# Patient Record
Sex: MALE | NOT HISPANIC OR LATINO | Employment: STUDENT | URBAN - METROPOLITAN AREA
[De-identification: names, ages, dates, MRNs, and addresses within clinical notes are randomized per-mention and may not be internally consistent; named-entity substitution may affect disease eponyms.]

---
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
## 2023-02-05 ENCOUNTER — HEALTH MAINTENANCE LETTER (OUTPATIENT)
Age: 18
End: 2023-02-05

## 2023-09-11 ENCOUNTER — MYC REFILL (OUTPATIENT)
Dept: FAMILY MEDICINE | Facility: CLINIC | Age: 18
End: 2023-09-11
Payer: COMMERCIAL

## 2023-09-12 RX ORDER — QUETIAPINE FUMARATE 200 MG/1
TABLET, FILM COATED ORAL
OUTPATIENT
Start: 2023-09-12

## 2023-09-12 NOTE — TELEPHONE ENCOUNTER
Does not appear that quetiapine has ever been prescribed by Molly's provider. Outside prescriber w/ last dispense December 2022. Pt also overdue for OV. Will decline prescription.

## 2023-09-12 NOTE — TELEPHONE ENCOUNTER
"Request for medication refill:  QUEtiapine (SEROQUEL) 200 MG tablet   Providers if patient needs an appointment and you are willing to give a one month supply please refill for one month and  send a letter/MyChart using \".SMILLIMITEDREFILL\" .smillimited and route chart to \"[facility] \" (Giving one month refill in non controlled medications is strongly recommended before denial)    If refill has been denied, meaning absolutely no refills without visit, please complete the smart phrase \".smirxrefuse\" and route it to the \"[facility] MED REFILLS\"  pool to inform the patient and the pharmacy.    Ginger Hinojosa RN      "

## 2024-03-03 ENCOUNTER — HEALTH MAINTENANCE LETTER (OUTPATIENT)
Age: 19
End: 2024-03-03

## 2024-05-07 ENCOUNTER — OFFICE VISIT (OUTPATIENT)
Dept: FAMILY MEDICINE | Facility: CLINIC | Age: 19
End: 2024-05-07
Payer: COMMERCIAL

## 2024-05-07 VITALS
HEART RATE: 90 BPM | OXYGEN SATURATION: 96 % | HEIGHT: 69 IN | DIASTOLIC BLOOD PRESSURE: 72 MMHG | BODY MASS INDEX: 29.83 KG/M2 | RESPIRATION RATE: 16 BRPM | WEIGHT: 201.4 LBS | TEMPERATURE: 98.6 F | SYSTOLIC BLOOD PRESSURE: 118 MMHG

## 2024-05-07 DIAGNOSIS — H90.6 MIXED CONDUCTIVE AND SENSORINEURAL HEARING LOSS OF BOTH EARS: ICD-10-CM

## 2024-05-07 DIAGNOSIS — Z00.00 ROUTINE GENERAL MEDICAL EXAMINATION AT A HEALTH CARE FACILITY: Primary | ICD-10-CM

## 2024-05-07 DIAGNOSIS — H61.23 BILATERAL IMPACTED CERUMEN: ICD-10-CM

## 2024-05-07 DIAGNOSIS — Z11.3 SCREENING EXAMINATION FOR STI: ICD-10-CM

## 2024-05-07 DIAGNOSIS — Z11.4 SCREENING FOR HIV (HUMAN IMMUNODEFICIENCY VIRUS): ICD-10-CM

## 2024-05-07 DIAGNOSIS — Z11.59 NEED FOR HEPATITIS C SCREENING TEST: ICD-10-CM

## 2024-05-07 LAB — T PALLIDUM AB SER QL: NONREACTIVE

## 2024-05-07 PROCEDURE — 69210 REMOVE IMPACTED EAR WAX UNI: CPT | Performed by: FAMILY MEDICINE

## 2024-05-07 PROCEDURE — 99213 OFFICE O/P EST LOW 20 MIN: CPT | Mod: 25 | Performed by: FAMILY MEDICINE

## 2024-05-07 PROCEDURE — 99395 PREV VISIT EST AGE 18-39: CPT | Mod: 25 | Performed by: FAMILY MEDICINE

## 2024-05-07 PROCEDURE — 90471 IMMUNIZATION ADMIN: CPT | Performed by: FAMILY MEDICINE

## 2024-05-07 PROCEDURE — 86780 TREPONEMA PALLIDUM: CPT | Performed by: FAMILY MEDICINE

## 2024-05-07 PROCEDURE — 90715 TDAP VACCINE 7 YRS/> IM: CPT | Performed by: FAMILY MEDICINE

## 2024-05-07 PROCEDURE — 86803 HEPATITIS C AB TEST: CPT | Performed by: FAMILY MEDICINE

## 2024-05-07 PROCEDURE — 87389 HIV-1 AG W/HIV-1&-2 AB AG IA: CPT | Performed by: FAMILY MEDICINE

## 2024-05-07 PROCEDURE — 36415 COLL VENOUS BLD VENIPUNCTURE: CPT | Performed by: FAMILY MEDICINE

## 2024-05-07 PROCEDURE — 87491 CHLMYD TRACH DNA AMP PROBE: CPT | Performed by: FAMILY MEDICINE

## 2024-05-07 PROCEDURE — 87591 N.GONORRHOEAE DNA AMP PROB: CPT | Performed by: FAMILY MEDICINE

## 2024-05-07 RX ORDER — TOPIRAMATE SPINKLE 25 MG/1
25 CAPSULE ORAL AT BEDTIME
COMMUNITY

## 2024-05-07 RX ORDER — VENLAFAXINE HYDROCHLORIDE 150 MG/1
150 CAPSULE, EXTENDED RELEASE ORAL DAILY
COMMUNITY

## 2024-05-07 RX ORDER — CHLORHEXIDINE GLUCONATE ORAL RINSE 1.2 MG/ML
SOLUTION DENTAL
COMMUNITY
Start: 2024-04-30

## 2024-05-07 RX ORDER — HYDROCODONE BITARTRATE AND ACETAMINOPHEN 5; 325 MG/1; MG/1
TABLET ORAL
COMMUNITY
Start: 2024-05-03

## 2024-05-07 RX ORDER — GABAPENTIN 100 MG/1
100 CAPSULE ORAL 3 TIMES DAILY
COMMUNITY
Start: 2024-04-16

## 2024-05-07 RX ORDER — IBUPROFEN 600 MG/1
TABLET, FILM COATED ORAL
COMMUNITY
Start: 2024-05-03

## 2024-05-07 SDOH — HEALTH STABILITY: PHYSICAL HEALTH: ON AVERAGE, HOW MANY DAYS PER WEEK DO YOU ENGAGE IN MODERATE TO STRENUOUS EXERCISE (LIKE A BRISK WALK)?: 4 DAYS

## 2024-05-07 SDOH — HEALTH STABILITY: PHYSICAL HEALTH: ON AVERAGE, HOW MANY MINUTES DO YOU ENGAGE IN EXERCISE AT THIS LEVEL?: 60 MIN

## 2024-05-07 ASSESSMENT — PATIENT HEALTH QUESTIONNAIRE - PHQ9
SUM OF ALL RESPONSES TO PHQ QUESTIONS 1-9: 15
SUM OF ALL RESPONSES TO PHQ QUESTIONS 1-9: 15
10. IF YOU CHECKED OFF ANY PROBLEMS, HOW DIFFICULT HAVE THESE PROBLEMS MADE IT FOR YOU TO DO YOUR WORK, TAKE CARE OF THINGS AT HOME, OR GET ALONG WITH OTHER PEOPLE: SOMEWHAT DIFFICULT

## 2024-05-07 ASSESSMENT — SOCIAL DETERMINANTS OF HEALTH (SDOH): HOW OFTEN DO YOU GET TOGETHER WITH FRIENDS OR RELATIVES?: NEVER

## 2024-05-07 ASSESSMENT — COLUMBIA-SUICIDE SEVERITY RATING SCALE - C-SSRS
6. HAVE YOU EVER DONE ANYTHING, STARTED TO DO ANYTHING, OR PREPARED TO DO ANYTHING TO END YOUR LIFE?: YES, LIFETIME
2. IN THE PAST MONTH, HAVE YOU ACTUALLY HAD ANY THOUGHTS OF KILLING YOURSELF?: NO
1. WITHIN THE PAST MONTH, HAVE YOU WISHED YOU WERE DEAD OR WISHED YOU COULD GO TO SLEEP AND NOT WAKE UP?: YES

## 2024-05-07 NOTE — PROGRESS NOTES
"Preventive Care Visit  [facility] TREMAINE Gudino DO, Family Medicine  May 7, 2024      Assessment & Plan     Routine general medical examination at a health care facility  Plan as below    Screening for HIV (human immunodeficiency virus)  Agreeable to STI screening today.   - HIV Screening  - HIV Screening    Need for hepatitis C screening test  Agreeable to STI screening today.   - Hepatitis C Screen Reflex to HCV RNA Quant and Genotype  - Hepatitis C Screen Reflex to HCV RNA Quant and Genotype    Screening examination for STI  Agreeable to STI screening today.   - Treponema Abs w Reflex to RPR and Titer  - NEISSERIA GONORRHOEA PCR  - CHLAMYDIA TRACHOMATIS PCR  - Treponema Abs w Reflex to RPR and Titer  - NEISSERIA GONORRHOEA PCR    Mixed conductive and sensorineural hearing loss of both ears  H/o cerumen impaction, see below, but hearing loss seems to be unrelated to this. Will refer for audilogy evaluation.  - Adult Audiology  Referral    Bilateral impacted cerumen   Removed using ear currette today bilaterally and irrigation of L ear only. Did have some cerumen remaining in L ear but able to visualize TM which appears normal.   - REMOVE IMPACTED CERUMEN                BMI  Estimated body mass index is 29.74 kg/m  as calculated from the following:    Height as of this encounter: 1.753 m (5' 9\").    Weight as of this encounter: 91.4 kg (201 lb 6.4 oz).       Depression Screening Follow Up          5/7/2024     5:02 PM   C-SSRS (Brief [place])   Within the last month, have you wished you were dead or wished you could go to sleep and not wake up? Yes   Within the last month, have you had any actual thoughts of killing yourself? No   Within the last month, have you ever done anything, started to do anything, or prepared to do anything to end your life? Yes, lifetime              Follow Up Actions Taken  Referred patient back to mental health provider    Counseling  Appropriate " preventive services were discussed with this patient, including applicable screening as appropriate for fall prevention, nutrition, physical activity, Tobacco-use cessation, weight loss and cognition.  Checklist reviewing preventive services available has been given to the patient.  Reviewed patient's diet, addressing concerns and/or questions.   Patient is at risk for social isolation and has been provided with information about the benefit of social connection.   The patient was instructed to see the dentist every 6 months.   The patient's PHQ-9 score is consistent with moderate depression. He was provided with information regarding depression.     Return if symptoms worsen or fail to improve.    Subjective   Ernesto is a 18 year old, presenting for the following:  Physical (Hearing concerns)        5/7/2024     2:02 PM   Additional Questions   Roomed by Garth         5/7/2024    Information    services provided? No        Health Care Directive  HPI    - Hearing loss - both ears; tried cleaning out wax but didn't seem to make much of a difference.     Sober for the past 2 weeks. Was sober before, but then started smoking cannabis again. Struggling with some memory issues. Has a good psychiatrist and going to therapy.   [facility] [place] location for therapy[facility]   3 prior attempts in 2022.   1.5 months ago; had some more specific thoughts after breakup. Now off work for 2 weeks and has been sober.   Had wisdom teeth out on Friday. Some constipation since using hydrocodone for pain.               5/7/2024   General Health   How would you rate your overall physical health? (!) FAIR   Feel stress (tense, anxious, or unable to sleep) Rather much   (!) STRESS CONCERN      5/7/2024   Nutrition   Three or more servings of calcium each day? (!) NO   Diet: Regular (no restrictions)   How many servings of fruit and vegetables per day? (!) 0-1   How many sweetened beverages  "each day? 0-1         5/7/2024   Exercise   Days per week of moderate/strenous exercise 4 days    4 days   Average minutes spent exercising at this level 60 min    60 min   Been going to the gym;       5/7/2024   Social Factors   Frequency of gathering with friends or relatives Never   Worry food won't last until get money to buy more No    No   Food not last or not have enough money for food? No    No   Do you have housing?  Yes    Yes   Are you worried about losing your housing? No    No   Lack of transportation? No    No   Unable to get utilities (heat,electricity)? No   (!) SOCIAL CONNECTIONS CONCERN      5/7/2024   Dental   Dentist two times every year? (!) NO         5/7/2024   TB Screening   Were you born outside of the [place]? No       Today's PHQ-9 Score:       5/7/2024     1:48 PM   PHQ-9 SCORE   PHQ-9 Total Score MyChart 15 (Moderately severe depression)   PHQ-9 Total Score 15         5/7/2024   Substance Use   Alcohol more than 3/day or more than 7/wk No   Do you use any other substances recreationally? No     Social History     Tobacco Use    Smoking status: Never    Smokeless tobacco: Never   Vaping Use    Vaping status: Never Used   Substance Use Topics    Alcohol use: No    Drug use: Yes     Types: Marijuana             5/7/2024   One time HIV Screening   Previous HIV test? No         5/7/2024   STI Screening   New sexual partner(s) since last STI/HIV test? No         5/7/2024   Contraception/Family Planning   Questions about contraception or family planning No        Reviewed and updated as needed this visit by Provider   Tobacco  Allergies  Meds  Problems  Med Hx  Surg Hx  Fam Hx                     Objective    Exam  /72 (BP Location: Left arm, Patient Position: Sitting, Cuff Size: Adult Regular)   Pulse 90   Temp 98.6  F (37  C) (Oral)   Resp 16   Ht 1.753 m (5' 9\")   Wt 91.4 kg (201 lb 6.4 oz)   SpO2 96%   BMI 29.74 kg/m     Estimated body mass index is 29.74 kg/m  as calculated " "from the following:    Height as of this encounter: 1.753 m (5' 9\").    Weight as of this encounter: 91.4 kg (201 lb 6.4 oz).    Physical Exam  GENERAL: alert and no distress  EYES: Eyes grossly normal to inspection, PERRL and conjunctivae and sclerae normal  HENT: normal cephalic/atraumatic, both ears: Impaced cerumen bilaterally; once cerumen removed normal TM., nose and mouth without ulcers or lesions, oropharynx clear, and oral mucous membranes moist  NECK: no adenopathy, no asymmetry, masses, or scars  RESP: lungs clear to auscultation - no rales, rhonchi or wheezes  CV: regular rate and rhythm, normal S1 S2, no S3 or S4, no murmur, click or rub, no peripheral edema  ABDOMEN: soft, nontender, no hepatosplenomegaly, no masses and bowel sounds normal  MS: no gross musculoskeletal defects noted, no edema  SKIN: no suspicious lesions or rashes  NEURO: Normal strength and tone, mentation intact and speech normal  PSYCH: mentation appears normal, affect normal/bright    Vision Screen  Vision Screen Details  Does the patient have corrective lenses (glasses/contacts)?: No  Vision Acuity Screen  RIGHT EYE: 10/10 (20/20)  LEFT EYE: 10/12.5 (20/25)  Is there a two line difference?: (!) YES  Vision Screen Results: Pass    Hearing Screen  RIGHT EAR  1000 Hz on Level 40 dB (Conditioning sound): Pass  1000 Hz on Level 20 dB: Pass  2000 Hz on Level 20 dB: Pass  4000 Hz on Level 20 dB: Pass  6000 Hz on Level 20 dB: Pass  8000 Hz on Level 20 dB: (!) Fail  LEFT EAR  8000 Hz on Level 20 dB: Pass  6000 Hz on Level 20 dB: Pass  4000 Hz on Level 20 dB: Pass  2000 Hz on Level 20 dB: Pass  1000 Hz on Level 20 dB: Pass  500 Hz on Level 25 dB: Pass  RIGHT EAR  500 Hz on Level 25 dB: (!) REFER  Results  Hearing Screen Results: Pass        Signed Electronically by: Chhaya Gudino DO      Answers submitted by the patient for this visit:  Patient Health Questionnaire (Submitted on 5/7/2024)  If you checked off any problems, how difficult have " these problems made it for you to do your work, take care of things at home, or get along with other people?: Somewhat difficult  PHQ9 TOTAL SCORE: 15

## 2024-05-07 NOTE — PROGRESS NOTES
Patient identified using two patient identifiers.  Ear exam showing wax occlusion completed by provider.  Solution: warm water was placed in the left ear(s) via irrigation tool: elephant ear   Answers submitted by the patient for this visit:  Patient Health Questionnaire (Submitted on 5/7/2024)  If you checked off any problems, how difficult have these problems made it for you to do your work, take care of things at home, or get along with other people?: Somewhat difficult  PHQ9 TOTAL SCORE: 15

## 2024-05-07 NOTE — PATIENT INSTRUCTIONS
"Preventive Care Advice   This is general advice we often give to help people stay healthy. Your care team may have specific advice just for you. Please talk to your care team about your own preventive care needs.  Lifestyle  Exercise at least 150 minutes each week (30 minutes a day, 5 days a week).  Do muscle strengthening activities 2 days a week. These help control your weight and prevent disease.  No smoking.  Wear sunscreen to prevent skin cancer.  Have your home tested for radon every 2 to 5 years. Radon is a colorless, odorless gas that can harm your lungs. To learn more, go to www.health.[facility].mn. and search for \"Radon in Homes.\"  Keep guns unloaded and locked up in a safe place like a safe or gun vault, or, use a gun lock and hide the keys. Always lock away bullets separately. To learn more, visit [employer]mn.gov and search for \"safe gun storage.\"  Nutrition  Eat 5 or more servings of fruits and vegetables each day.  Try wheat bread, brown rice and whole grain pasta (instead of white bread, rice, and pasta).  Get enough calcium and vitamin D. Check the label on foods and aim for 100% of the RDA (recommended daily allowance).  Regular exams  Have a dental exam and cleaning every 6 months.  See your health care team every year to talk about:  Any changes in your health.  Any medicines your care team has prescribed.  Preventive care, family planning, and ways to prevent chronic diseases.  Shots (vaccines)   HPV shots (up to age 26), if you've never had them before.  Hepatitis B shots (up to age 59), if you've never had them before.  COVID-19 shot: Get this shot when it's due.  Flu shot: Get a flu shot every year.  Tetanus shot: Get a tetanus shot every 10 years.  Pneumococcal, hepatitis A, and RSV shots: Ask your care team if you need these based on your risk.  Shingles shot (for age 50 and up).  General health tests  Diabetes screening:  Starting at age 35, Get screened for diabetes at least every 3 years.  If " you are younger than age 35, ask your care team if you should be screened for diabetes.  Cholesterol test: At age 39, start having a cholesterol test every 5 years, or more often if advised.  Bone density scan (DEXA): At age 50, ask your care team if you should have this scan for osteoporosis (brittle bones).  Hepatitis C: Get tested at least once in your life.  Abdominal aortic aneurysm screening: Talk to your doctor about having this screening if you:  Have ever smoked; and  Are biologically male; and  Are between the ages of 65 and 75.  STIs (sexually transmitted infections)  Before age 24: Ask your care team if you should be screened for STIs.  After age 24: Get screened for STIs if you're at risk. You are at risk for STIs (including HIV) if:  You are sexually active with more than one person.  You don't use condoms every time.  You or a partner was diagnosed with a sexually transmitted infection.  If you are at risk for HIV, ask about PrEP medicine to prevent HIV.  Get tested for HIV at least once in your life, whether you are at risk for HIV or not.  Cancer screening tests  Cervical cancer screening: If you have a cervix, begin getting regular cervical cancer screening tests at age 21. Most people who have regular screenings with normal results can stop after age 65. Talk about this with your provider.  Breast cancer scan (mammogram): If you've ever had breasts, begin having regular mammograms starting at age 40. This is a scan to check for breast cancer.  Colon cancer screening: It is important to start screening for colon cancer at age 45.  Have a colonoscopy test every 10 years (or more often if you're at risk) Or, ask your provider about stool tests like a FIT test every year or Cologuard test every 3 years.  To learn more about your testing options, visit: www.[employer]/322051.pdf.  For help making a decision, visit: singh/lj76838.  Prostate cancer screening test: If you have a prostate and are age 55  to 69, ask your provider if you would benefit from a yearly prostate cancer screening test.  Lung cancer screening: If you are a current or former smoker age 50 to 80, ask your care team if ongoing lung cancer screenings are right for you.  For informational purposes only. Not to replace the advice of your health care provider. Copyright   2023 [employer]. All rights reserved. Clinically reviewed by the [facility] Transitions Program. [employer] 750492 - REV 04/24.    Relationships for Good Health  Relationships are important for our health and happiness. Social isolation, loneliness and lack of support are bad for your health. Studies show that loneliness can harm health and limit your life span as much as high blood pressure and smoking.   Take some time to reflect on your relationships. Then answer these questions:  Are there people in your life that cause you stress or drain your energy? What can you do to set limits?  ________________________________________________________________________________________________________________________________________________________________________________________________________________________________________________________________________________________________________________________________________________  Who do you enjoy spending time with? Who can you go to for support?  ________________________________________________________________________________________________________________________________________________________________________________________________________________________________________________________________________________________________________________________________________________  What can you do to improve your relationships with  others?  __________________________________________________________________________________________________________________________________________________________________________________________________________________  ______________________________________________________________________________________________________________________________  What do you like most about your relationships with others?  ________________________________________________________________________________________________________________________________________________________________________________________________________________________________________________________________________________________________________________________________________________  My goal: ______________________________________________________________________  I will ______________________________________________________________________________________________________________________________________________________________________________________________    For informational purposes only. Not to replace the advice of your health care provider. Copyright   2018 [facility]. All rights reserved. Clinically reviewed by [facility]  Team. SMARTworks 233555 - Rev 04/21.    Learning About Stress  What is stress?     Stress is your body's response to a hard situation. Your body can have a physical, emotional, or mental response. Stress is a fact of life for most people, and it affects everyone differently. What causes stress for you may not be stressful for someone else.  A lot of things can cause stress. You may feel stress when you go on a job interview, take a test, or run a race. This kind of short-term stress is normal and even useful. It can help you if you need to work hard or react quickly. For example, stress can help you finish an important job on time.  Long-term stress is caused by ongoing stressful situations or events. Examples  of long-term stress include long-term health problems, ongoing problems at work, or conflicts in your family. Long-term stress can harm your health.  How does stress affect your health?  When you are stressed, your body responds as though you are in danger. It makes hormones that speed up your heart, make you breathe faster, and give you a burst of energy. This is called the fight-or-flight stress response. If the stress is over quickly, your body goes back to normal and no harm is done.  But if stress happens too often or lasts too long, it can have bad effects. Long-term stress can make you more likely to get sick, and it can make symptoms of some diseases worse. If you tense up when you are stressed, you may develop neck, shoulder, or low back pain. Stress is linked to high blood pressure and heart disease.  Stress also harms your emotional health. It can make you campbell, tense, or depressed. Your relationships may suffer, and you may not do well at work or school.  What can you do to manage stress?  You can try these things to help manage stress:   Do something active. Exercise or activity can help reduce stress. Walking is a great way to get started. Even everyday activities such as housecleaning or yard work can help.  Try yoga or derrek chi. These techniques combine exercise and meditation. You may need some training at first to learn them.  Do something you enjoy. For example, listen to music or go to a movie. Practice your hobby or do volunteer work.  Meditate. This can help you relax, because you are not worrying about what happened before or what may happen in the future.  Do guided imagery. Imagine yourself in any setting that helps you feel calm. You can use online videos, books, or a teacher to guide you.  Do breathing exercises. For example:  From a standing position, bend forward from the waist with your knees slightly bent. Let your arms dangle close to the floor.  Breathe in slowly and deeply as you  "return to a standing position. Roll up slowly and lift your head last.  Hold your breath for just a few seconds in the standing position.  Breathe out slowly and bend forward from the waist.  Let your feelings out. Talk, laugh, cry, and express anger when you need to. Talking with supportive friends or family, a counselor, or a marcia leader about your feelings is a healthy way to relieve stress. Avoid discussing your feelings with people who make you feel worse.  Write. It may help to write about things that are bothering you. This helps you find out how much stress you feel and what is causing it. When you know this, you can find better ways to cope.  What can you do to prevent stress?  You might try some of these things to help prevent stress:  Manage your time. This helps you find time to do the things you want and need to do.  Get enough sleep. Your body recovers from the stresses of the day while you are sleeping.  Get support. Your family, friends, and community can make a difference in how you experience stress.  Limit your news feed. Avoid or limit time on social media or news that may make you feel stressed.  Do something active. Exercise or activity can help reduce stress. Walking is a great way to get started.  Where can you learn more?  Go to https://www.[employer].net/patiented  Enter N032 in the search box to learn more about \"Learning About Stress.\"  Current as of: October 24, 2023               Content Version: 14.0    0921-7968 [employer].   Care instructions adapted under license by your healthcare professional. If you have questions about a medical condition or this instruction, always ask your healthcare professional. [employer] disclaims any warranty or liability for your use of this information.      Learning About Depression Screening  What is depression screening?  Depression screening is a way to see if you have depression symptoms. It may be done by a doctor or " "counselor. It's often part of a routine checkup. That's because your mental health is just as important as your physical health.  Depression is a mental health condition that affects how you feel, think, and act. You may:  Have less energy.  Lose interest in your daily activities.  Feel sad and grouchy for a long time.  Depression is very common. It affects people of all ages.  Many things can lead to depression. Some people become depressed after they have a stroke or find out they have a major illness like cancer or heart disease. The death of a loved one or a breakup may lead to depression. It can run in families. Most experts believe that a combination of inherited genes and stressful life events can cause it.  What happens during screening?  You may be asked to fill out a form about your depression symptoms. You and the doctor will discuss your answers. The doctor may ask you more questions to learn more about how you think, act, and feel.  What happens after screening?  If you have symptoms of depression, your doctor will talk to you about your options.  Doctors usually treat depression with medicines or counseling. Often, combining the two works best. Many people don't get help because they think that they'll get over the depression on their own. But people with depression may not get better unless they get treatment.  The cause of depression is not well understood. There may be many factors involved. But if you have depression, it's not your fault.  A serious symptom of depression is thinking about death or suicide. If you or someone you care about talks about this or about feeling hopeless, get help right away.  It's important to know that depression can be treated. Medicine, counseling, and self-care may help.  Where can you learn more?  Go to https://www.healthwise.net/patiented  Enter T185 in the search box to learn more about \"Learning About Depression Screening.\"  Current as of: June 24, " 2023               Content Version: 14.0    4353-1702 [employer].   Care instructions adapted under license by your healthcare professional. If you have questions about a medical condition or this instruction, always ask your healthcare professional. [employer] disclaims any warranty or liability for your use of this information.

## 2024-05-08 LAB
C TRACH DNA SPEC QL NAA+PROBE: NEGATIVE
HCV AB SERPL QL IA: NONREACTIVE
HIV 1+2 AB+HIV1 P24 AG SERPL QL IA: NONREACTIVE
N GONORRHOEA DNA SPEC QL NAA+PROBE: NEGATIVE

## 2024-05-21 ENCOUNTER — OFFICE VISIT (OUTPATIENT)
Dept: FAMILY MEDICINE | Facility: CLINIC | Age: 19
End: 2024-05-21
Payer: COMMERCIAL

## 2024-05-21 VITALS
HEART RATE: 94 BPM | OXYGEN SATURATION: 99 % | RESPIRATION RATE: 18 BRPM | SYSTOLIC BLOOD PRESSURE: 118 MMHG | WEIGHT: 204.1 LBS | BODY MASS INDEX: 30.23 KG/M2 | HEIGHT: 69 IN | TEMPERATURE: 98.1 F | DIASTOLIC BLOOD PRESSURE: 76 MMHG

## 2024-05-21 DIAGNOSIS — M25.511 ACUTE PAIN OF RIGHT SHOULDER: Primary | ICD-10-CM

## 2024-05-21 PROCEDURE — 99213 OFFICE O/P EST LOW 20 MIN: CPT | Performed by: FAMILY MEDICINE

## 2024-05-21 ASSESSMENT — PAIN SCALES - GENERAL: PAINLEVEL: EXTREME PAIN (9)

## 2024-05-21 NOTE — PROGRESS NOTES
"  Assessment & Plan     Acute pain of right shoulder  Not currently present. Hard to know exact etiology but suspicious for overuse injury. Given plan to return to work shortly - discussed symptomatic management at home and recommended PT to help prevent return of symptoms. Pt agreeable to plan.   - Physical Therapy  Referral            BMI  Estimated body mass index is 30.14 kg/m  as calculated from the following:    Height as of this encounter: 1.753 m (5' 9\").    Weight as of this encounter: 92.6 kg (204 lb 1.6 oz).       Return in about 2 weeks (around 6/4/2024), or if symptoms worsen or fail to improve.    Subjective   Ernesto is a 18 year old, presenting for the following health issues:  Musculoskeletal Problem (Right shoulder/ bicep muscle area, since playing baseball as a child but now is getting worse. Work on tree's cuttng and trimming, climbs  ladder)      5/21/2024     3:56 PM   Additional Questions   Roomed by efrain   Accompanied by self         5/21/2024    Information    services provided? No     HPI                 1) R shoulder pain  Has h/o shoulder problems since playing baseball in childhood; R hand dominant.   Started to get worse a couple months ago  Going back to work next week and worried it may exacerbate pain.   Has been icing at home.   Today pain feels better.         Objective    /76   Pulse 94   Temp 98.1  F (36.7  C) (Oral)   Resp 18   Ht 1.753 m (5' 9\")   Wt 92.6 kg (204 lb 1.6 oz)   SpO2 99%   BMI 30.14 kg/m    Body mass index is 30.14 kg/m .  Physical Exam  Constitutional:       Appearance: He is well-developed.   HENT:      Head: Normocephalic and atraumatic.   Eyes:      Conjunctiva/sclera: Conjunctivae normal.   Pulmonary:      Effort: Pulmonary effort is normal.   Musculoskeletal:         General: Normal range of motion.      Right shoulder: Normal.      Cervical back: Normal range of motion and neck supple.      Comments: R shoulder: No " tenderness to palpation. Unable to reproduce with provacative testing today.    Skin:     General: Skin is warm and dry.   Neurological:      Mental Status: He is alert and oriented to person, place, and time.   Psychiatric:         Behavior: Behavior normal.         Thought Content: Thought content normal.         Judgment: Judgment normal.                Signed Electronically by: Chhaya Gudino DO     Normal for race

## 2024-05-21 NOTE — PATIENT INSTRUCTIONS
1) Can use ibuprofen (400mg or 2 pills every 6-8 hours) as needed for pain.   2) Can use ice as needed for pain  3) Referral to physical therapy to help prevent pain from happening in the future.     Chhaya Gudino,

## 2025-02-19 ENCOUNTER — OFFICE VISIT (OUTPATIENT)
Dept: FAMILY MEDICINE | Facility: CLINIC | Age: 20
End: 2025-02-19
Payer: COMMERCIAL

## 2025-02-19 VITALS
DIASTOLIC BLOOD PRESSURE: 82 MMHG | BODY MASS INDEX: 31.03 KG/M2 | HEIGHT: 68 IN | OXYGEN SATURATION: 97 % | SYSTOLIC BLOOD PRESSURE: 126 MMHG | RESPIRATION RATE: 17 BRPM | HEART RATE: 106 BPM | TEMPERATURE: 98.6 F

## 2025-02-19 DIAGNOSIS — M25.511 CHRONIC RIGHT SHOULDER PAIN: Primary | ICD-10-CM

## 2025-02-19 DIAGNOSIS — G56.01 CARPAL TUNNEL SYNDROME OF RIGHT WRIST: ICD-10-CM

## 2025-02-19 DIAGNOSIS — F32.2 CURRENT SEVERE EPISODE OF MAJOR DEPRESSIVE DISORDER WITHOUT PSYCHOTIC FEATURES, UNSPECIFIED WHETHER RECURRENT (H): ICD-10-CM

## 2025-02-19 DIAGNOSIS — G89.29 CHRONIC RIGHT SHOULDER PAIN: Primary | ICD-10-CM

## 2025-02-19 PROCEDURE — 99214 OFFICE O/P EST MOD 30 MIN: CPT | Performed by: FAMILY MEDICINE

## 2025-02-19 PROCEDURE — 3078F DIAST BP <80 MM HG: CPT | Performed by: FAMILY MEDICINE

## 2025-02-19 PROCEDURE — 1125F AMNT PAIN NOTED PAIN PRSNT: CPT | Performed by: FAMILY MEDICINE

## 2025-02-19 PROCEDURE — G2211 COMPLEX E/M VISIT ADD ON: HCPCS | Performed by: FAMILY MEDICINE

## 2025-02-19 PROCEDURE — 3074F SYST BP LT 130 MM HG: CPT | Performed by: FAMILY MEDICINE

## 2025-02-19 RX ORDER — TOPIRAMATE 50 MG/1
50 TABLET, FILM COATED ORAL AT BEDTIME
COMMUNITY
Start: 2025-02-14

## 2025-02-19 RX ORDER — DEXTROAMPHETAMINE SACCHARATE, AMPHETAMINE ASPARTATE, DEXTROAMPHETAMINE SULFATE AND AMPHETAMINE SULFATE 5; 5; 5; 5 MG/1; MG/1; MG/1; MG/1
1 TABLET ORAL
COMMUNITY
Start: 2025-01-13

## 2025-02-19 RX ORDER — QUETIAPINE FUMARATE 50 MG/1
50 TABLET, FILM COATED ORAL AT BEDTIME
COMMUNITY
Start: 2025-01-15

## 2025-02-19 RX ORDER — GABAPENTIN 800 MG/1
800 TABLET ORAL 3 TIMES DAILY PRN
COMMUNITY
Start: 2025-01-15

## 2025-02-19 RX ORDER — TOPIRAMATE 25 MG/1
25 TABLET, FILM COATED ORAL AT BEDTIME
COMMUNITY
Start: 2025-01-15

## 2025-02-19 ASSESSMENT — PATIENT HEALTH QUESTIONNAIRE - PHQ9
10. IF YOU CHECKED OFF ANY PROBLEMS, HOW DIFFICULT HAVE THESE PROBLEMS MADE IT FOR YOU TO DO YOUR WORK, TAKE CARE OF THINGS AT HOME, OR GET ALONG WITH OTHER PEOPLE: SOMEWHAT DIFFICULT
SUM OF ALL RESPONSES TO PHQ QUESTIONS 1-9: 20
SUM OF ALL RESPONSES TO PHQ QUESTIONS 1-9: 20

## 2025-02-19 ASSESSMENT — PAIN SCALES - GENERAL: PAINLEVEL_OUTOF10: MILD PAIN (3)

## 2025-02-19 NOTE — PROGRESS NOTES
"  Assessment & Plan     Chronic right shoulder pain  Chronic pain with prior injury in teens. Recommended PT evaluation which patient is agreeable to. Discussed decreased activity at work to facilitate healing - pt unable to do this at this time due to financial concerns. Will start with PT instead.   - Physical Therapy  Referral    Carpal tunnel syndrome of right wrist  Likely carpal tunnel, discussed stretches to do at home. Consider OT after completing PT    Current severe episode of major depressive disorder  Discussed mood today. Encouraged ongoing follow-up at psychiatry for this (sees Aleksandr).   PHQ 9 is 20.         BMI  Estimated body mass index is 31.03 kg/m  as calculated from the following:    Height as of this encounter: 1.727 m (5' 8\").    Weight as of 5/21/24: 92.6 kg (204 lb 1.6 oz).       The longitudinal plan of care for the diagnosis(es)/condition(s) as documented were addressed during this visit. Due to the added complexity in care, I will continue to support Ernesto in the subsequent management and with ongoing continuity of care.      Return in about 4 weeks (around 3/19/2025) for Pain follow-up.    Subjective   Ernesto is a 19 year old, presenting for the following health issues:  RECHECK (Right arm, wrist, including shoulder all hurt. And would like to discuss medications. )        2/19/2025     4:18 PM   Additional Questions   Roomed by Madelaine   Accompanied by self         2/19/2025    Information    services provided? No     HPI                 1) R arm pain    R hand dominant.     Shoulder:   Has had chronic shoulder issues since playing baseball when younger, related to throwing style.   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  Burning sensation throughout R arm.   Occurred in October  Pain is constant but waxes and wanes.   Trying to avoid using R side as much as possible.     Elbow:   ? Tennis " "elbow; pain with extension especially with laying down. Has sharp pain over olecranon.     Wrist:   Wonders about carpal tunnel; Has pain in wrist and thumb; has been sleeping with wrist brace for the past 4-5 months which has been helpful (not worse, maybe slightly better)    2) Medication side effects  Sees psych at [facility] but having trouble scheduling a visit.   Therapy    Works M-F, 7am until 3:30 at earliest but usually 4/4:30p.           Objective    /82   Pulse 106   Temp 98.6  F (37  C) (Oral)   Resp 17   Ht 1.727 m (5' 8\")   SpO2 97%   BMI 31.03 kg/m    Body mass index is 31.03 kg/m .  Physical Exam  Constitutional:       Appearance: He is well-developed.   HENT:      Head: Normocephalic and atraumatic.   Eyes:      Conjunctiva/sclera: Conjunctivae normal.   Pulmonary:      Effort: Pulmonary effort is normal.   Musculoskeletal:         General: Normal range of motion.      Cervical back: Normal range of motion and neck supple.      Comments: Positive tinels and phalen's sign R wrist.   Tenderness to palpation over medial epicondyle R elbow.   R shoulder: Full ROM. Positive empty can test. No bony tenderness.    Skin:     General: Skin is warm and dry.   Neurological:      Mental Status: He is alert and oriented to person, place, and time.   Psychiatric:         Behavior: Behavior normal.         Thought Content: Thought content normal.         Judgment: Judgment normal.                Signed Electronically by: Chhaya Gudino DO    Answers submitted by the patient for this visit:  Patient Health Questionnaire (Submitted on 2/19/2025)  If you checked off any problems, how difficult have these problems made it for you to do your work, take care of things at home, or get along with other people?: Somewhat difficult  PHQ9 TOTAL SCORE: 20    "

## 2025-03-03 NOTE — PATIENT INSTRUCTIONS
Patient Education   Here is the plan from today's visit    1. Chronic right shoulder pain (Primary)  - Physical Therapy  Referral; Future    2. Carpal tunnel syndrome of right wrist    3. Current severe episode of major depressive disorder without psychotic features, unspecified whether recurrent (H)      Please call or return to clinic if your symptoms don't go away.    Follow up plan  Return in about 4 weeks (around 3/19/2025) for Pain follow-up.    Thank you for coming to [facility] today.  Lab Testing:  **If you had lab testing today and your results are reassuring or normal they will be mailed to you or sent through [employer] within 7 days.   **If the lab tests need quick action we will call you with the results.  **If you are having labs done on a different day, please call [phone] to schedule at [facility] or [phone] for other [facility] locations. Labs do not offer walk-in appointments.  The phone number we will call with results is # [phone] (home) . If this is not the best number please call our clinic and change the number.  Medication Refills:  If you need any refills please call your pharmacy and they will contact us.   If you need to  your refill at a new pharmacy, please contact the new pharmacy directly. The new pharmacy will help you get your medications transferred faster.   Scheduling:  If you have any concerns about today's visit or wish to schedule another appointment please call our office during normal business hours [phone] (8-5:00 M-F). If you can no longer make a scheduled visit, please cancel via [employer] or call us to cancel.   If a referral was made to an [facility] specialty provider and you do not get a call from central scheduling, please refer to directions on your visit summary or call our office during normal business hours for assistance.   If a Mammogram was ordered for you at the [facility] call [phone]  to schedule or change your appointment.  If you had an XRay/CT/Ultrasound/MRI ordered the number is [phone] to schedule or change your radiology appointment.   [facility] has limited ultrasound appointments available on Wednesdays, if you would like your ultrasound at [facility], please call [phone] to schedule.   Medical Concerns:  If you have urgent medical concerns please call [phone] at any time of the day.    Chhaya Gudino, DO

## 2025-04-07 ENCOUNTER — PATIENT OUTREACH (OUTPATIENT)
Dept: CARE COORDINATION | Facility: CLINIC | Age: 20
End: 2025-04-07
Payer: COMMERCIAL

## 2025-04-09 NOTE — PROGRESS NOTES
"PHYSICAL THERAPY EVALUATION  Type of Visit: Evaluation       Fall Risk Screen:  Fall screen completed by: PT  Have you fallen 2 or more times in the past year?: Yes  Have you fallen and had an injury in the past year?: No  Timed Up and Go score (seconds): 7 seconds  Is patient a fall risk?: No    Subjective         Presenting condition or subjective complaint: Had an injury at work with my shoulder where it popped weirdly and felt like it was on fire but numb for a second. Viola also been struggling with carpal tunnel which is one of my main reasons for physical therapy as it affects me the most and is constant.  Ernesto is a pleasant active 20 y/o  with chronic shoulder pain that started in late October 2024. He was tossing a rope up to a co-worker, and then immediately felt a \"weird pop\" and had intense tingling in his arm for 30 seconds that made it feel like he couldn't move it wittout severe pain. He also had an intense burning sensation in his shoulder and arm that lasted 5-6 minutes. Now, his ROM is limited compared to his left. The pain increases with lying on that side. when lifting things over his shoulder, when carrying logs over his shoulder, when carrying weight at his side, and when climbing trees. He is right hand dominant, and has a history of playing hockey and baseball (3rd base and catcher) in high school. He is also having symptoms consistent with medial epicondylitis and carpal tunnel on the right. Seeking OT/Hand therapy order to address these other pains from different injury.     Date of onset: 10/25/24 (end of October)    Relevant medical history: Depression; Mental Illness; Overweight; Substance use disorder   Active Ambulatory Problems     Diagnosis Date Noted    IBS (irritable bowel syndrome) 12/03/2012    Suicide attempt by drug ingestion (H) 02/11/2022    Deliberate self-cutting 03/02/2022    Binge eating disorder 03/02/2022    Current severe episode of major depressive disorder " without psychotic features, unspecified whether recurrent (H) 03/02/2022    Suicidal ideation 04/07/2022    Cannabis use disorder, severe, dependence (H) 08/23/2022     Resolved Ambulatory Problems     Diagnosis Date Noted    Closed nondisplaced fracture of neck of fifth metacarpal bone of right hand with routine healing, subsequent encounter 12/02/2016    Overweight (BMI 25.0-29.9) 02/12/2018    Pneumomediastinum (H) 10/01/2021    Acidosis, lactic 02/11/2022    Acute respiratory failure with hypoxia and hypercapnia (H) 02/11/2022    Agitation requiring sedation protocol 02/11/2022    Clonus 02/11/2022    High anion gap metabolic acidosis 02/11/2022    Hyperbilirubinemia 02/11/2022    Hypercalcemia 02/11/2022    On mechanically assisted ventilation (H) 02/11/2022    Acute hypernatremia 02/11/2022    Seizure (H) 02/11/2022    Serotonin syndrome 02/11/2022    Sinus tachycardia 02/11/2022    Fluoxetine hydrochloride poisoning 02/11/2022     Past Medical History:   Diagnosis Date    Anxiety     Irritable bowel syndrome     MDD (major depressive disorder)       Dates & types of surgery:    Past Surgical History:   Procedure Laterality Date    HC REMOVE TONSILS/ADENOIDS,<13 Y/O      Description: Tonsillectomy With Adenoidectomy;  Recorded: 06/04/2013;  Comments: 2005    HC REMOVE TONSILS/ADENOIDS,<13 Y/O      Description: Tonsillectomy With Adenoidectomy;  Recorded: 06/04/2013;  Comments: 2005    no surgical history        Prior diagnostic imaging/testing results:           Prior therapy history for the same diagnosis, illness or injury: No      Prior Level of Function  Transfers: Independent  Ambulation: Independent  ADL: Independent      Living Environment  Social support: With family members   Type of home: House; 2-story   Stairs to enter the home: No       Ramp: No   Stairs inside the home: Yes 12 Is there a railing: Yes     Help at home: None  Equipment owned:       Employment: Yes / Tree  Climber  Hobbies/Interests: video games and working    Patient goals for therapy: Just not have limited mobility in my shoulder really and have comfort in my wrist    Pain assessment: Pain present  See objective evaluation for additional pain details     Objective   SHOULDER EVALUATION  PAIN: Pain Level at Rest: 1/10  Pain Level with Use: 7/10  Pain Location: anterior and superior right shoulder  Pain Quality: Aching  Pain Frequency: intermittent  Pain is Exacerbated By: lifting over shoulder, carrying logs over his shoulder, carrying at his side, climbing trees, sleeping on that side  Pain is Relieved By: rest and vibrating massage, ice, stretching     POSTURE: Sitting Posture: Rounded shoulders, Forward head    CERVICAL SCREEN: Regularly has neck pain 2/10, stiffness.  More on the left than the right.     ROM:   Shoulder and Elbow ROM ( )   AROM in degrees    Left Right   Shoulder Flexion (0-180 )  with pain 4/10   Shoulder Abduction (0-180 )  with pain 4/10   Shoulder Extension (0-60 ) WNL WNL 4/10 with trap spasm   Shoulder ER (0-90 ) 45 45   Shoulder IR (0-70 ) WNL WNL   Shoulder IR/EXT T8 T8 stiffness   Elbow Flexion (150 ) WNL WNL   Elbow Extension (0 ) WNL WNL    PROM in degrees    Left Right   Shoulder Flexion (0-180 )     Shoulder Abduction (0-180 )     Shoulder Extension (0-60 )     Shoulder ER (0-90 )     Shoulder IR (0-70 )     Elbow Flexion (150 )     Elbow Extension (0 )         STRENGTH:   Left Right   Shoulder Flexion 5 5, P 1/10   Shoulder Extension     Shoulder Abduction 5 5 P 3/10   Shoulder Adduction     Shoulder Internal Rotation 5 5, P 1/10   Shoulder External Rotation 5 5, P 2/10   Elbow Flexion 5 5   Elbow Extension 5 5   Mid Trap     Lower Trap     Rhomboid     Serratus Anterior       FLEXIBILITY: Decreased rhomboids R, Decreased upper trap R, Decreased levator R, Decreased pectoralis major R, Decreased pectoralis minor R, Decreased latissimus R    DERMATOMES: NT    SPECIAL  TESTS:   Impingement Cluster Left (+/-) Right (+/-)   Edmondson-Jd  -   Painful Arc  -   Infraspinatus Test  -   Neer's Test     AC Tests Left (+/-) Right (+/-)   Active Compression     Crossbody Adduction  +   AC Resisted Extension     GH Instability Tests Left (+/-) Right (+/-)   Sulcus Sign  -   Apprehension     Relocation     Load and Shift     Other:     Rotator Cuff Tests Left (+/-) Right (+/-)   Drop Arm sign  -   Hornblower's     ER Lag Sign     IR Lag Sign     Empty Can     Belly Press     Lift Off     Labral Tests Left (+/-) Right (+/-)   Biceps Load Test II  -   Jerk Test     Kristin Test     Clunk Test     Crank Test     [place]'s     Other:     Biceps Tests Left (+/-) Right (+/-)   Speed     Yergason's     Other:     Other:     ULTT Left (+/-) Right (+/-)   Median  -   Ulnar  -   Radial  -     PALPATION:  Right: biceps, deltoid, upper trap, levator, rhomboids, pec minor/ major    JOINT MOBILITY:  right inferior and posterior GH hypomobility      Assessment & Plan   CLINICAL IMPRESSIONS  Medical Diagnosis: Chronic right shoulder pain    Treatment Diagnosis: Chronic right shoulder pain   Impression/Assessment: Patient is a 19 year old male with right shoulder pain complaints.  The following significant findings have been identified: Pain, Decreased ROM/flexibility, Decreased joint mobility, Impaired muscle performance, Decreased activity tolerance, and Impaired posture. These impairments interfere with their ability to perform self care tasks, work tasks, recreational activities, and sleeping  as compared to previous level of function.     Clinical Decision Making (Complexity):  Clinical Presentation: Stable/Uncomplicated  Clinical Presentation Rationale: based on medical and personal factors listed in PT evaluation  Clinical Decision Making (Complexity): Low complexity    PLAN OF CARE  Treatment Interventions:  Interventions: Manual Therapy, Neuromuscular Re-education, Therapeutic Activity, Therapeutic  Exercise, Self-Care/Home Management    Long Term Goals     PT Goal 1  Goal Identifier: HEP  Goal Description: Patient will demonstrate independence with HEP and self management of symptoms  Rationale: to maximize safety and independence with performance of ADLs and functional tasks  Goal Progress: initial  Target Date: 06/09/25  PT Goal 2  Goal Identifier: climbing trees  Goal Description: Patient will return to climbing trees with right shoulder pain 3/10 or less  Rationale: to maximize safety and independence with performance of ADLs and functional tasks  Goal Progress: initial  Target Date: 06/09/25  PT Goal 3  Goal Identifier: sleeping  Goal Description: Patient will sleep on his right side without interruption for shoulder pain  Rationale: to maximize safety and independence with performance of ADLs and functional tasks  Goal Progress: initial  Target Date: 06/09/25  PT Goal 4  Goal Identifier: carrying  Goal Description: Patient will return to carrying logs over his shoulder as needed with pain 3/10 or less on right shoulder.  Rationale: to maximize safety and independence with performance of ADLs and functional tasks  Goal Progress: initial  Target Date: 06/09/25      Frequency of Treatment: 1x/week  Duration of Treatment: 60 days    Recommended Referrals to Other Professionals: Occupational Therapy, Hand therapy to address R medial epicondylitis and R carpal tunnel.  Education Assessment:   Learner/Method: Patient;No Barriers to Learning    Risks and benefits of evaluation/treatment have been explained.   Patient/Family/caregiver agrees with Plan of Care.     Evaluation Time:     PT Eval, Low Complexity Minutes (19381): 30       Signing Clinician: Marquez Davis, PT

## 2025-04-10 ENCOUNTER — THERAPY VISIT (OUTPATIENT)
Dept: PHYSICAL THERAPY | Facility: REHABILITATION | Age: 20
End: 2025-04-10
Attending: FAMILY MEDICINE
Payer: COMMERCIAL

## 2025-04-10 DIAGNOSIS — G89.29 CHRONIC RIGHT SHOULDER PAIN: ICD-10-CM

## 2025-04-10 DIAGNOSIS — M25.511 CHRONIC RIGHT SHOULDER PAIN: ICD-10-CM

## 2025-04-10 ASSESSMENT — ACTIVITIES OF DAILY LIVING (ADL)
AT_ITS_WORST?: 4
PUTTING_ON_YOUR_PANTS: 0
DRIVING: A LITTLE BIT OF DIFFICULTY
CARRYING_A_SMALL_SUITCASE_WITH_YOUR_AFFECTED_LIMB: A LITTLE BIT OF DIFFICULTY
OPENING_DOORS: NO DIFFICULTY
WASHING_YOUR_HAIR_OR_SCALP: NO DIFFICULTY
VACUUMING,_SWEEPING,_OR_RAKING: A LITTLE BIT OF DIFFICULTY
USING_TOOLS_OR_APPLIANCES: A LITTLE BIT OF DIFFICULTY
WASHING_YOUR_BACK: 1
DRESS: NO DIFFICULTY
UEFI_TOTAL_SCORE/80: .84
REACHING_FOR_SOMETHING_ON_A_HIGH_SHELF: 2
YOUR_USUAL_HOBBIES,_RECREATIONAL_OR_SPORTING_ACTIVITIES: A LITTLE BIT OF DIFFICULTY
SLEEPING: MODERATE DIFFICULTY
PUSHING_UP_ON_YOUR_HANDS: NO DIFFICULTY
CLEANING: NO DIFFICULTY
PUSHING_WITH_THE_INVOLVED_ARM: 0
REMOVING_SOMETHING_FROM_YOUR_BACK_POCKET: 0
PLEASE_INDICATE_YOR_PRIMARY_REASON_FOR_REFERRAL_TO_THERAPY:: SHOULDER
LIFTING_A_BAG_OF_GROCERIES_TO_WAIST_LEVEL: NO DIFFICULTY
PLEASE_INDICATE_YOR_PRIMARY_REASON_FOR_REFERRAL_TO_THERAPY:: ELBOW
DOING_UP_BUTTONS: A LITTLE BIT OF DIFFICULTY
OPENING_A_JAR: NO DIFFICULTY
THROWING_A_BALL: A LITTLE BIT OF DIFFICULTY
PLACING_AN_OBJECT_ON_A_HIGH_SHELF: 2
TOUCHING_THE_BACK_OF_YOUR_NECK: 1
WASHING_YOUR_HAIR?: 1
LAUNDERING_CLOTHES: NO DIFFICULTY
PLACING_AN_OBJECT_ONTO,_OR_REMOVING_IT_FROM_AN_OVERHEAD_SHELF: NO DIFFICULTY
TYING_OR_LACING_SHOES: NO DIFFICULTY
PREPARING_FOOD: NO DIFFICULTY
WHEN_LYING_ON_THE_INVOLVED_SIDE: 6
ANY_OF_YOUR_USUAL_WORK,_HOUSEWORK_OR_SCHOOL_ACTIVITIES: MODERATE DIFFICULTY
PUTTING_ON_AN_UNDERSHIRT_OR_A_PULLOVER_SWEATER: 0
PUTTING_ON_A_SHIRT_THAT_BUTTONS_DOWN_THE_FRONT: 0
CARRYING_A_HEAVY_OBJECT_OF_10_POUNDS: 1
UEFI_TOTAL_SCORE: 67

## 2025-04-21 ENCOUNTER — PATIENT OUTREACH (OUTPATIENT)
Dept: CARE COORDINATION | Facility: CLINIC | Age: 20
End: 2025-04-21
Payer: COMMERCIAL

## 2025-05-13 ENCOUNTER — THERAPY VISIT (OUTPATIENT)
Dept: OCCUPATIONAL THERAPY | Facility: CLINIC | Age: 20
End: 2025-05-13
Attending: FAMILY MEDICINE
Payer: COMMERCIAL

## 2025-05-13 DIAGNOSIS — M77.01 MEDIAL EPICONDYLITIS OF ELBOW, RIGHT: Primary | ICD-10-CM

## 2025-05-13 DIAGNOSIS — G56.01 CARPAL TUNNEL SYNDROME OF RIGHT WRIST: ICD-10-CM

## 2025-05-13 PROCEDURE — 97110 THERAPEUTIC EXERCISES: CPT | Mod: GO | Performed by: OCCUPATIONAL THERAPIST

## 2025-05-13 PROCEDURE — 97165 OT EVAL LOW COMPLEX 30 MIN: CPT | Mod: GO | Performed by: OCCUPATIONAL THERAPIST

## 2025-05-13 PROCEDURE — 97112 NEUROMUSCULAR REEDUCATION: CPT | Mod: GO | Performed by: OCCUPATIONAL THERAPIST

## 2025-05-13 NOTE — PROGRESS NOTES
"OCCUPATIONAL THERAPY EVALUATION  Type of Visit: Evaluation       Fall Risk Screen:  Have you fallen 2 or more times in the past year?: No  Have you fallen and had an injury in the past year?: No  Is patient receiving Physical Therapy Services?: No    Subjective        Presenting condition or subjective complaint: carpal tunnel  Date of onset: 04/10/25 (order date)    Relevant medical history:     Past Medical History:   Diagnosis Date    Anxiety     Binge eating disorder     Irritable bowel syndrome     MDD (major depressive disorder)    Dates & types of surgery:    Past Surgical History:   Procedure Laterality Date    HC REMOVE TONSILS/ADENOIDS,<9 Y/O      Description: Tonsillectomy With Adenoidectomy;  Recorded: 06/04/2013;  Comments: 2005    HC REMOVE TONSILS/ADENOIDS,<11 Y/O      Description: Tonsillectomy With Adenoidectomy;  Recorded: 06/04/2013;  Comments: 2005    no surgical history       Prior diagnostic imaging/testing results:       Prior therapy history for the same diagnosis, illness or injury:        Prior Level of Function  Transfers: Independent  Ambulation: Independent  ADL: Independent  IADL:     Living Environment  Social support:     Type of home:     Stairs to enter the home:         Ramp:     Stairs inside the home:         Help at home:    Equipment owned:       Employment:      Hobbies/Interests:      Patient goals for therapy:      Copied from PT note:  \"Presenting condition or subjective complaint: Had an injury at work with my shoulder where it popped weirdly and felt like it was on fire but numb for a second. Viola also been struggling with carpal tunnel which is one of my main reasons for physical therapy as it affects me the most and is constant.  Ernesto is a pleasant active 18 y/o  with chronic shoulder pain that started in late October 2024. He was tossing a rope up to a co-worker, and then immediately felt a \"weird pop\" and had intense tingling in his arm for 30 seconds that " "made it feel like he couldn't move it wittout severe pain. He also had an intense burning sensation in his shoulder and arm that lasted 5-6 minutes. Now, his ROM is limited compared to his left. The pain increases with lying on that side. when lifting things over his shoulder, when carrying logs over his shoulder, when carrying weight at his side, and when climbing trees. He is right hand dominant, and has a history of playing hockey and baseball (3rd base and catcher) in high school. He is also having symptoms consistent with medial epicondylitis and carpal tunnel on the right. Seeking OT/Hand therapy order to address these other pains from different injury.\"    Pain assessment: Pain present     Objective   ADDITIONAL HISTORY:  Right hand dominant  Patient reports symptoms of pain, weakness/loss of strength, and tingling   Transportation: drives  Currently working in normal job without restrictions    Functional Outcome Measure:   Upper Extremity Functional Index Score:  SCORE:   Column Totals: /80: (Patient-Rptd) 62   (A lower score indicates greater disability.)    Pain Level (Scale 0-10)   5/13/2025   At Rest 1.5-2/10   With Use 5/10     Pain Description  Date 5/13/2025   Location elbow, wrist, hand, thumb, and index finger   Pain Quality Pressure above index MCP, tingling, along medial elbow   Frequency Constant     Pain is worst  Sleeping, lying down for elbow   Exacerbated by  Use, sleeping   Relieved by rest and OTC brace     Posture  Forward Neck Posture and Rounded Forward Shoulders    Sensation  Decreased Median Nerve distribution per pt report    ROM  Pain Report: - none  + mild    ++ moderate    +++ severe   Wrist 5/13/2025 5/13/2025   AROM (PROM) L R   Extension 64 62    Flexion 71 68 +   RD 35 18 + radial side of wrist   UD 35 32 +     Resisted Testing  Pain Report:  - none    + mild    ++ moderate    +++ severe    5/13/2025   Elbow Extension 5/5   Elbow Flexion 4/5   Supination  5/5   Pronation 5/5 "   Wrist Ext with RD, Elbow at side 5/5   Wrist Ext with UD, Elbow at side 5/5   Wrist Ext with RD, Elbow Ext 5/5   Wrist Ext with UD, Elbow Ext 5/5   Wrist Flex with RD, Elbow at side 4/5   Wrist Flex with UD, Elbow at side 4/5   Wrist Flex with RD, Elbow Ext 3+/5   Wrist Flex with UD, Elbow Ext 3+/5    FDS 4/5     Special Tests  Pain Report:  - none    + mild    ++ moderate    +++ severe    5/13/2025   Median Nerve Compression at Pronator -   Carpal Compression Test--Durkan Test (30 sec) +   Loza Test for Lumbrical Incursion  (fist x 30 secs) +   Tinels at Carpal Tunnel +   Phalens +   Tinels at DRSN -   Long Finger Test -     Strength   (Measured in pounds)  Pain Report:  - none    + mild    ++ moderate    +++ severe    5/13/2025 5/13/2025   Trials L R   1   143 146   Average 143 146     Lat Pinch 5/13/2025 5/13/2025   Trials L R   1   24 25 -   Average 24 25 -     3 Pt Pinch 5/13/2025 5/13/2025   Trials L R   1   20 21   Average 20 21     Palpation  Pain Report:  - none    + mild    ++ moderate    +++ severe   Date 5/13/2025   Distal Bicep Tendon -   MEP +   Flexor Origin +   Flexor Wad -   Pronator Teres -   Thenars/adductors +     Assessment & Plan   CLINICAL IMPRESSIONS  Medical Diagnosis: Medial epicondylitis of elbow, right  Carpal tunnel syndrome of right wrist    Treatment Diagnosis: R wrist, elbow, and hand pain    Impression/Assessment: Pt is a 19 year old male presenting to Occupational Therapy due to gradual onset.  The following significant findings have been identified: Impaired activity tolerance, Impaired sensation, and Pain and impaired MMT strength. These identified deficits interfere with their ability to perform self care tasks, work tasks, recreational activities, household chores, driving , and meal planning and preparation as compared to previous level of function.     Clinical Decision Making (Complexity):  Assessment of Occupational Performance: 5 or more Performance  Deficits  Occupational Performance Limitations: bathing/showering, toileting, dressing, functional mobility, hygiene and grooming, driving and community mobility, health management and maintenance, home establishment and management, meal preparation and cleanup, shopping, sleep, work, and leisure activities  Clinical Decision Making (Complexity): Low complexity    PLAN OF CARE  Treatment Interventions:   Modalities:  US, Hot Packs and Paraffin  Therapeutic Exercise:  AROM, AAROM, PROM, Tendon Gliding, Extensor Tracking, Isotonics, Isometrics and Stabilization  Neuromuscular re-education:  Nerve Gliding, Proprioceptive Training, Kinesiotaping, Strain Counter Strain  Manual Techniques:  Coordination/Dexterity, Joint mobilization, Friction massage, Myofascial release, and Manual edema mobilization  Orthotic Fabrication:  Static and Forearm based  Self Care:  Self Care Tasks, Ergonomic Considerations, and Work Tasks    Long Term Goals   OT Goal 1  Goal Identifier: IADL - household chores  Goal Description: No difficulty (4 UEFI score) vacuuming  Rationale: In order to maximize safety and independence with performance of self-care activities  Goal Progress: Goal established - 2 UEFI score  Target Date: 07/11/25      Frequency of Treatment: 1x/week  Duration of Treatment: 4 weeks, tapering to biweekly for 4 weeks     Recommended Referrals to Other Professionals:   Education Assessment: Learner/Method: Patient;Demonstration  Education Comments: PTRX on phone     Risks and benefits of evaluation/treatment have been explained.   Patient/Family/caregiver agrees with Plan of Care.     Evaluation Time:    OT Eval, Low Complexity Minutes (93581): 18       Signing Clinician: Solange Rodriguez OT

## 2025-05-20 ENCOUNTER — THERAPY VISIT (OUTPATIENT)
Dept: OCCUPATIONAL THERAPY | Facility: CLINIC | Age: 20
End: 2025-05-20
Attending: FAMILY MEDICINE
Payer: COMMERCIAL

## 2025-05-20 DIAGNOSIS — G56.01 CARPAL TUNNEL SYNDROME OF RIGHT WRIST: ICD-10-CM

## 2025-05-20 DIAGNOSIS — M77.01 MEDIAL EPICONDYLITIS OF ELBOW, RIGHT: Primary | ICD-10-CM

## 2025-05-20 PROCEDURE — 97112 NEUROMUSCULAR REEDUCATION: CPT | Mod: GO | Performed by: OCCUPATIONAL THERAPIST

## 2025-05-20 PROCEDURE — 97140 MANUAL THERAPY 1/> REGIONS: CPT | Mod: GO | Performed by: OCCUPATIONAL THERAPIST

## 2025-05-27 ENCOUNTER — THERAPY VISIT (OUTPATIENT)
Dept: OCCUPATIONAL THERAPY | Facility: CLINIC | Age: 20
End: 2025-05-27
Attending: FAMILY MEDICINE
Payer: COMMERCIAL

## 2025-05-27 DIAGNOSIS — G56.01 CARPAL TUNNEL SYNDROME OF RIGHT WRIST: ICD-10-CM

## 2025-05-27 DIAGNOSIS — M77.01 MEDIAL EPICONDYLITIS OF ELBOW, RIGHT: Primary | ICD-10-CM

## 2025-05-27 PROCEDURE — 97112 NEUROMUSCULAR REEDUCATION: CPT | Mod: GO | Performed by: OCCUPATIONAL THERAPIST

## 2025-05-27 PROCEDURE — 97140 MANUAL THERAPY 1/> REGIONS: CPT | Mod: GO | Performed by: OCCUPATIONAL THERAPIST

## 2025-06-08 ENCOUNTER — HEALTH MAINTENANCE LETTER (OUTPATIENT)
Age: 20
End: 2025-06-08

## 2025-06-24 ENCOUNTER — THERAPY VISIT (OUTPATIENT)
Dept: OCCUPATIONAL THERAPY | Facility: CLINIC | Age: 20
End: 2025-06-24
Payer: COMMERCIAL

## 2025-06-24 DIAGNOSIS — G56.01 CARPAL TUNNEL SYNDROME OF RIGHT WRIST: ICD-10-CM

## 2025-06-24 DIAGNOSIS — M77.01 MEDIAL EPICONDYLITIS OF ELBOW, RIGHT: Primary | ICD-10-CM

## 2025-06-24 PROCEDURE — 97110 THERAPEUTIC EXERCISES: CPT | Mod: GO | Performed by: OCCUPATIONAL THERAPIST

## 2025-06-24 PROCEDURE — 97112 NEUROMUSCULAR REEDUCATION: CPT | Mod: GO | Performed by: OCCUPATIONAL THERAPIST

## 2025-06-24 NOTE — PROGRESS NOTES
"   06/24/25 0500   Appointment Info   Treating Provider Solange Rodriguez, OTR/L, CHT   Total/Authorized Visits 6 (POC)   Visits Used 4   Medical Diagnosis Medial epicondylitis of elbow, right  Carpal tunnel syndrome of right wrist   OT Tx Diagnosis R wrist, elbow, and hand pain   Other pertinent information history of forearm/CTS pain, shoulder injury in october 2024   Progress Note/Certification   Onset of Illness/Injury or Date of Surgery 04/10/25  (order date)   Therapy Frequency 1x/week   Predicted Duration 4 weeks, tapering to biweekly for 4 weeks   Progress Note Due Date 07/11/25   Progress Note Completed Date 05/13/25   OT Goal 1   Goal Identifier IADL - household chores   Goal Description No difficulty (4 UEFI score) vacuuming   Rationale In order to maximize safety and independence with performance of self-care activities   Goal Progress No difficulty (4 UEFI score)   Target Date 07/11/25   Date Met 06/24/25   Subjective Report   Subjective Report \"Pretty good.\"   Objective Measures   Objective Measures Objective Measure 1;Hand Obj Measures   Hand Objective Measures ROM   ROM Wrist ROM   Objective Measure 1   Objective Measure Pain report   Details No pain at rest, it can get up to a 2-3/10, not bothersome, just there per pt report   Wrist ROM    Radial Deviation (RD) 19 + thumb  (improvement from eval)   Ulnar Deviation (UD) 35  (improvement)   Treatment Interventions (OT)   Interventions Therapeutic Procedure/Exercise;Neuromuscular Re-education;Manual Therapy   Neuromuscular Re-education   Neuromuscular Re-ed Minutes (37187) 10   Neuromuscular Re-education Neuro Re-ed 2   Neuro Re-ed 1 Nerve Gliding Distal Median   Neuro Re-ed 1 - Details passively in clinic till a release was felt   Neuro Re-ed 2 Nerve Gliding Distal Radial   Neuro Re-ed 2 - Details passively in clinic till a release was felt   PTRx Neuro Re-ed 1 Nerve Gliding Distal Median   PTRx Neuro Re-ed 1 - Details 5 reps reviewed excellent carryover. " Reduced to 1-2x/day   PTRx Neuro Re-ed 2 Nerve Gliding Distal Radial   PTRx Neuro Re-ed 2 - Details 5 reps reviewed excellent carryover. Reduced to 1-2x/day   Skilled Intervention Graded nerve gliding to reduce tingling, pain along volar forearm for return to ADL/IADL   Patient Response/Progress Good   Therapeutic Procedure/Exercise   Therapeutic Procedure: strength, endurance, ROM, flexibillity minutes (05102) 8   PTRx Ther Proc 1 Forearm Active Stretch for Medial Epicondylitis   PTRx Ther Proc 1 - Details 2 reps reviewed weaned to 1-2x/day eventually PRN   PTRx Ther Proc 2 Wrist Stabilization DTM/Dart Thrower's Motion   PTRx Ther Proc 2 - Details 10 repetitions pause for 5 seconds at the end range of motion 1-2x/day Added to HEP d/t radial sided wrist pain   Skilled Intervention Modified/advanced HEP to reduce pain, tingling, increase functional use of UE   Patient Response/Progress Good   Education   Learner/Method Patient;Demonstration   Education Comments PTRX on phone   Plan   Home program see flowsheet/PTRX   Updates to plan of care HEP modification     Upper Extremity Functional Index Score:  SCORE:   Column Totals: /80: 77   (A lower score indicates greater disability.)    Beginning/End Dates of Progress Note Reporting Period:  05/13/25 to 06/24/2025    Referring Provider:  Chhaya Gudino    DISCHARGE  Reason for Discharge: Patient has met all goals.  Able to manage independently with HEP.    Discharge Plan: Patient to continue home program.  Other services: PT for shoulder vs neck if needed.    Referring Provider:  Chhaya Gudino